# Patient Record
Sex: MALE | Race: WHITE | NOT HISPANIC OR LATINO | Employment: OTHER | URBAN - METROPOLITAN AREA
[De-identification: names, ages, dates, MRNs, and addresses within clinical notes are randomized per-mention and may not be internally consistent; named-entity substitution may affect disease eponyms.]

---

## 2017-01-02 ENCOUNTER — HOSPITAL ENCOUNTER (EMERGENCY)
Facility: HOSPITAL | Age: 67
Discharge: HOME | End: 2017-01-02
Payer: MEDICARE

## 2017-01-02 VITALS
SYSTOLIC BLOOD PRESSURE: 126 MMHG | TEMPERATURE: 98 F | BODY MASS INDEX: 21.97 KG/M2 | HEART RATE: 94 BPM | OXYGEN SATURATION: 95 % | WEIGHT: 140 LBS | HEIGHT: 67 IN | DIASTOLIC BLOOD PRESSURE: 86 MMHG | RESPIRATION RATE: 20 BRPM

## 2017-01-02 DIAGNOSIS — J02.9 SORE THROAT: ICD-10-CM

## 2017-01-02 DIAGNOSIS — R05.9 COUGH: Primary | ICD-10-CM

## 2017-01-02 PROCEDURE — 99284 EMERGENCY DEPT VISIT MOD MDM: CPT

## 2017-01-02 RX ORDER — PREDNISOLONE SODIUM PHOSPHATE 15 MG/5ML
30 SOLUTION ORAL DAILY
Qty: 30 ML | Refills: 0 | Status: SHIPPED | OUTPATIENT
Start: 2017-01-02 | End: 2017-01-05

## 2017-01-02 RX ORDER — AMOXICILLIN 400 MG/5ML
7 POWDER, FOR SUSPENSION ORAL 3 TIMES DAILY
Qty: 210 ML | Refills: 0 | Status: SHIPPED | OUTPATIENT
Start: 2017-01-02 | End: 2017-01-12

## 2017-01-02 RX ORDER — PROMETHAZINE HYDROCHLORIDE AND DEXTROMETHORPHAN HYDROBROMIDE 6.25; 15 MG/5ML; MG/5ML
5 SYRUP ORAL EVERY 4 HOURS PRN
Qty: 240 ML | Refills: 0 | Status: SHIPPED | OUTPATIENT
Start: 2017-01-02 | End: 2017-01-12

## 2017-01-02 NOTE — ED PROVIDER NOTES
Encounter Date: 1/2/2017       History     Chief Complaint   Patient presents with    Sore Throat     pt. reports difficulty swallowing and sore throat     Cough     also reports cough      Review of patient's allergies indicates:  No Known Allergies  HPI Comments: 66 year old male presents with complaint of cough associated with with difficulty swallowing and sore throat that began gradually 2 days ago. Pt reports symptoms are constant and moderate. Pain is sore. No radiation. No modifying factors. No fever. Pt has tracheostomy. He reports no changes with breathing.    The history is provided by the patient.     Past Medical History   Diagnosis Date    Anxiety     Cancer     Chronic pain     Hyperlipidemia     Thyroid disease      No past medical history pertinent negatives.  Past Surgical History   Procedure Laterality Date    Esophageal reconstruction      Total laryngectomy       History reviewed. No pertinent family history.  Social History   Substance Use Topics    Smoking status: Current Every Day Smoker     Packs/day: 1.50     Years: 25.00    Smokeless tobacco: Never Used    Alcohol use No     Review of Systems   Constitutional: Negative.  Negative for appetite change, chills and fever.   HENT: Positive for sore throat and trouble swallowing. Negative for congestion and rhinorrhea.    Eyes: Negative.    Respiratory: Positive for cough. Negative for chest tightness, shortness of breath and wheezing.    Cardiovascular: Negative.  Negative for chest pain and palpitations.   Gastrointestinal: Negative.  Negative for abdominal pain, nausea and vomiting.   Endocrine: Negative.    Genitourinary: Negative.    Musculoskeletal: Negative.  Negative for back pain and neck pain.   Skin: Negative.  Negative for pallor and rash.   Allergic/Immunologic: Negative.    Neurological: Negative.  Negative for dizziness and syncope.   Hematological: Negative.    Psychiatric/Behavioral: Negative.    All other systems  reviewed and are negative.      Physical Exam   Initial Vitals   BP Pulse Resp Temp SpO2   01/02/17 1059 01/02/17 1059 01/02/17 1059 01/02/17 1059 01/02/17 1059   126/86 94 20 98.4 °F (36.9 °C) 95 %     Physical Exam    Nursing note and vitals reviewed.  Constitutional: He appears well-developed and well-nourished.   HENT:   Head: Normocephalic and atraumatic.   Nose: Nose normal.   Mouth/Throat: Oropharynx is clear and moist.   bilat tm hazy in appearance. No sinus tenderness. bilat nare clear and patent. Clear pharynx with erythema, cobblestone appearence, 1+ symmetric tonsils with no exudate. Pt has tracheostomy. No stridor.    Eyes: Conjunctivae and EOM are normal.   Neck: Normal range of motion. Neck supple.   Cardiovascular: Normal rate, regular rhythm and normal heart sounds.   No murmur heard.  Pulmonary/Chest: Breath sounds normal. No respiratory distress. He has no wheezes. He has no rhonchi. He has no rales. He exhibits no tenderness.   Abdominal: Soft. Bowel sounds are normal. He exhibits no distension. There is no tenderness. There is no rebound and no guarding.   Musculoskeletal: Normal range of motion.   Neurological: He is alert and oriented to person, place, and time. He has normal strength. No sensory deficit.   Skin: Skin is warm and dry.   Psychiatric: He has a normal mood and affect. Thought content normal.         ED Course   Procedures  Labs Reviewed - No data to display     Imaging Results         X-Ray Chest PA And Lateral (Final result) Result time:  01/02/17 11:54:23    Final result by Hoang Frey MD (01/02/17 11:54:23)    Impression:         No acute findings.  No significant change since 12/02/2016.      Electronically signed by: HOANG FREY MD  Date:     01/02/17  Time:    11:54     Narrative:    Exam: XR CHEST PA AND LATERAL,    Date:  01/02/17 11:22:29    History: Cough    Comparison:  12/02/2016        Findings:     Lungs clear.  Heart size within normal limits perm  of vascular opacification of the aorta identified.  Mild degenerative change of the spine.            X-Ray Neck Soft Tissue (Edited Result - FINAL) Result time:  01/02/17 12:08:12    Addendum 1 of 1 by Hoang Frey MD (01/02/17 12:08:12)    Addendum:    Additional clinical information obtained.  The patient has undergone a laryngectomy.  Severe soft tissue swelling and narrowing the airway is thought to be due to the laryngectomy.  Otherwise the airway is unremarkable.          Electronically signed by: HOANG FREY MD  Date:     01/02/17  Time:    12:08           Final result by Hoang Frey MD (01/02/17 11:53:53)    Impression:         Severe airway narrowing at the level of the pharynx and superior trachea with evidence of severe soft tissue swelling.      Electronically signed by: HOANG FREY MD  Date:     01/02/17  Time:    11:53     Narrative:    Exam: XR NECK SOFT TISSUE,    Date:  01/02/17 11:22:29    History: Acute pharyngitis .    Comparison:  No prior  studies available for comparison.    Findings:     Severe luminal narrowing identified along the pharynx and within the superior trachea with evidence of severe mucosal soft tissue edema.  No radiopaque foreign body detected there                 12:00 PM consulted with Radiology Dr. Frey, reported pt's hx and symptoms. Will create an addendum with changes based on pertinent information that was given.    12:09 PM The patient is resting comfortably and is in no acute distress. Pt denies any changes in breathing. He only reports swallowing difficulty and is stable able to tolerate liquids but not normal solids. Pt instructed he will follow up with his GI Doctor. They were closed for Holidays. I have discussed case with Dr. Peralta who recommends no further actions and and agrees with plan to dc home with rx and GI follow up. Discussed test results, shared treatment plan, specific conditions for return, and importance  of follow up with the appropriate physician with patient. He understands and agrees with the plan as discussed. Answered his questions at this time. He has remained hemodynamically stable throughout the ED course and is appropriate for discharge home.                    ED Course     Clinical Impression:   The primary encounter diagnosis was Cough. A diagnosis of Sore throat was also pertinent to this visit.          Jerod Nuñez Jr., [facility]  01/02/17 1211       Jerod Nuñez Jr., [facility]  01/02/17 1211

## 2017-01-02 NOTE — DISCHARGE INSTRUCTIONS
Self-Care for Sore Throats  Sore throats occur for many reasons, such as colds, allergies, and infections caused by viruses or bacteria. In any case, your throat becomes red and sore. Your goal for self-care is to reduce your discomfort while giving your throat a chance to heal.    Moisten and Soothe Your Throat  · Try a sip of water first thing after waking up.  · Keep your throat moist by drinking 6 or more glasses of clear liquids every day.  · Run a cool-air humidifier in your room overnight.  · Avoid cigarette smoke.   · Suck on throat lozenges, cough drops, hard candy, ice chips, or frozen fruit-juice bars. Use the sugar-free versions if your diet or medical condition require them.  Gargle to Ease Irritation  Gargling every hour or 2 can ease irritation. Try gargling with 1 of these solutions:  · 1/4 teaspoon of salt in 1/2 cup of warm water  · An over-the-counter anesthetic gargle  Use Medication for More Relief  Over-the-counter medication can reduce sore throat symptoms. Ask your pharmacist if you have questions about which medication to use:  · Ease pain with anesthetic sprays. Aspirin or an aspirin substitute also helps. Remember, never give aspirin to anyone 18 or younger, or if you are already taking blood thinners.   · For sore throats caused by allergies, try antihistamines to block the allergic reaction.  · Remember: unless a sore throat is caused by a bacterial infection, antibiotics wont help you.  Prevent Future Sore Throats  · Stop smoking or reduce contact with secondhand smoke. Smoke irritates the tender throat lining.  · Limit contact with pets and with allergy-causing substances, such as pollen and mold.  · When youre around someone with a sore throat or cold, wash your hands frequently to keep viruses or bacteria from spreading.  · Dont strain your vocal cords.  Call Your Health Care Provider  Contact your doctor if you have:  · A temperature over 101°F (38.3°C)  · White spots on the  throat  · Great difficulty swallowing  · Trouble breathing  · A skin rash  · Recent exposure to someone else with strep bacteria  · Severe hoarseness and swollen glands in the neck or jaw   © 7187-8030 [employer]. [address] [place] [address], [place] [address]. All rights reserved. This information is not intended as a substitute for professional medical care. Always follow your healthcare professional's instructions.

## 2017-01-02 NOTE — ED NOTES
Bed: RWR 01  Expected date:   Expected time:   Means of arrival:   Comments:  closed     Jerod Nuñez Jr., [facility]  01/02/17 1128

## 2017-01-18 ENCOUNTER — TELEPHONE (OUTPATIENT)
Dept: INTERNAL MEDICINE | Facility: CLINIC | Age: 67
End: 2017-01-18

## 2017-01-18 NOTE — TELEPHONE ENCOUNTER
They called from pain management to see if pt is ok for MAC sedation no intubation . Do we need to see him?

## 2017-01-18 NOTE — TELEPHONE ENCOUNTER
----- Message from Kinga Roldan sent at 1/18/2017  4:32 PM CST -----  Contact: marilyn cruz/ [facility]  needs to make sure its ok for pt to undergo mac iv sedation no intubation, pls fax ok asap w/ letterhead to [phone]//callback is [phone]

## 2017-01-25 ENCOUNTER — TELEPHONE (OUTPATIENT)
Dept: INTERNAL MEDICINE | Facility: CLINIC | Age: 67
End: 2017-01-25

## 2017-01-25 NOTE — TELEPHONE ENCOUNTER
----- Message from Evy Tang sent at 1/25/2017  9:28 AM CST -----  Contact: Nati/ Dr. Ramos Office  Nati needs surgery clearance, Patient is schedule for surgery for 1/26/17, Please call her at [phone] ext [phone] and ask for Nati.    Thanks  Td